# Patient Record
Sex: FEMALE | Race: BLACK OR AFRICAN AMERICAN | Employment: FULL TIME | URBAN - METROPOLITAN AREA
[De-identification: names, ages, dates, MRNs, and addresses within clinical notes are randomized per-mention and may not be internally consistent; named-entity substitution may affect disease eponyms.]

---

## 2021-03-23 ENCOUNTER — OFFICE VISIT (OUTPATIENT)
Dept: ENDOCRINOLOGY | Age: 61
End: 2021-03-23
Payer: COMMERCIAL

## 2021-03-23 VITALS
SYSTOLIC BLOOD PRESSURE: 155 MMHG | WEIGHT: 121 LBS | RESPIRATION RATE: 18 BRPM | TEMPERATURE: 98.6 F | HEART RATE: 65 BPM | OXYGEN SATURATION: 97 % | DIASTOLIC BLOOD PRESSURE: 82 MMHG

## 2021-03-23 DIAGNOSIS — E55.9 VITAMIN D DEFICIENCY: ICD-10-CM

## 2021-03-23 DIAGNOSIS — M81.0 SENILE OSTEOPOROSIS: ICD-10-CM

## 2021-03-23 DIAGNOSIS — E21.3 HYPERPARATHYROIDISM (HCC): Primary | ICD-10-CM

## 2021-03-23 DIAGNOSIS — E83.52 HYPERCALCEMIA: ICD-10-CM

## 2021-03-23 PROCEDURE — 99204 OFFICE O/P NEW MOD 45 MIN: CPT | Performed by: INTERNAL MEDICINE

## 2021-03-23 RX ORDER — PRAVASTATIN SODIUM 20 MG/1
20 TABLET ORAL 3 TIMES WEEKLY
COMMUNITY

## 2021-03-23 RX ORDER — ERGOCALCIFEROL 1.25 MG/1
50000 CAPSULE ORAL
COMMUNITY
End: 2021-06-28 | Stop reason: ALTCHOICE

## 2021-03-23 RX ORDER — ESTRADIOL 2 MG/1
TABLET ORAL EVERY OTHER DAY
COMMUNITY

## 2021-03-23 RX ORDER — ALBUTEROL SULFATE 90 UG/1
1 AEROSOL, METERED RESPIRATORY (INHALATION)
COMMUNITY

## 2021-03-23 RX ORDER — FLUTICASONE PROPIONATE 50 MCG
2 SPRAY, SUSPENSION (ML) NASAL DAILY
COMMUNITY

## 2021-03-23 RX ORDER — OMEPRAZOLE 40 MG/1
40 CAPSULE, DELAYED RELEASE ORAL DAILY
COMMUNITY
End: 2022-01-28 | Stop reason: ALTCHOICE

## 2021-03-23 RX ORDER — LANOLIN ALCOHOL/MO/W.PET/CERES
CREAM (GRAM) TOPICAL
COMMUNITY

## 2021-03-23 NOTE — LETTER
3/28/2021 Patient: Terri Singh YOB: 1960 Date of Visit: 3/23/2021 Odalis Brambila NP 
[address] [address] Via Fax: [phone] Dear Odalis Brambila NP, Thank you for referring Ms. Terri Singh to [address] [place] [address] for evaluation. My notes for this consultation are attached. If you have questions, please do not hesitate to call me. I look forward to following your patient along with you. Sincerely, Evaristo Fisher MD

## 2021-03-23 NOTE — PATIENT INSTRUCTIONS
SPECIFIC INSTRUCTIONS BELOW  
 
STOP drisdol  
 
 
---------------------------------------------------------------- 
 
 
24 hour urine collection instructions for calcium ,  And sodium and creatitine On the day you plan to collect urine 1. Discard first urine sample soon after you get up 2. Start collecting urine samples in the container then on whole first day . Taylor Sanchez ... 3. until the first sample next day is collected into the jar. PAY ATTENTION TO THESE GENERAL INSTRUCTIONS  
 
- HEALTH MAINTENANCE IS NOT GOING TO BE UP TO DATE ON YOUR AVS- PLEASE IGNORE  
- YOUR MED LIST IS NOT UP TO DATE AS SOME CHANGES ARE BEING MADE AFTER THE VISIT - FOLLOW SPECIFIC INSTRUCTIONS ABOVE Results *Normal results will not be notified by a phone call starting January 1 2021 *If you have an upcoming visit, the results will be discussed at the visit *Please sign up for MY CHART if you want access to your lab and test results *Abnormal results which require immediate attention will be notified by phone call *Abnormal results which do not require immediate assistance will be notified in 1-2 weeks Refills    -    have your pharmacy send us a refill request 
Phone calls  -  Allow  24 hrs. for non-urgent calls to be returned Prior authorization - It may take 2-4 weeks to process Forms  -  FMLA, DMV etc., will take up to 2 weeks to process Cancellations - please notify the office 2 days in advance Samples  - will only be dispensed at visits  
 
--------------------------------------------------------------------------------------------

## 2021-03-23 NOTE — PROGRESS NOTES
1. Have you been to the ER, urgent care clinic since your last visit?no  Hospitalized since your last visit? No    2. Have you seen or consulted any other health care providers outside of the [address] [place] [address] since your last visit? Include any pap smears or colon screening.  No    Wt Readings from Last 3 Encounters:   03/23/21 121 lb (54.9 kg)     Temp Readings from Last 3 Encounters:   03/23/21 98.6 °F (37 °C)     BP Readings from Last 3 Encounters:   03/23/21 (!) 155/82     Pulse Readings from Last 3 Encounters:   03/23/21 65

## 2021-03-23 NOTE — PROGRESS NOTES
HISTORY OF PRESENT ILLNESS  Jina Woody is a 64 y.o. female. HPI  Initial visit for hyper parathyroidism- intact PTH of 117   From nov 2020   Referred by pcp     Calcium was 10.7  Nov 2020     She is taking drisdol weekly   She has no hyper calcemia symptoms     Past Medical History:   Diagnosis Date    GERD (gastroesophageal reflux disease)     Hyperlipidemia        Social History     Socioeconomic History    Marital status: UNKNOWN     Spouse name: Not on file    Number of children: Not on file    Years of education: Not on file    Highest education level: Not on file   Occupational History    Not on file   Social Needs    Financial resource strain: Not on file    Food insecurity     Worry: Not on file     Inability: Not on file   [employer] needs     Medical: Not on file     Non-medical: Not on file   Tobacco Use    Smoking status: Current Every Day Smoker     Packs/day: 0.50     Years: 40.00     Pack years: 20.00    Smokeless tobacco: Never Used   Substance and Sexual Activity    Alcohol use:  Yes    Drug use: Not Currently     Types: Marijuana    Sexual activity: Not on file   Lifestyle    Physical activity     Days per week: Not on file     Minutes per session: Not on file    Stress: Not on file   Relationships    Social connections     Talks on phone: Not on file     Gets together: Not on file     Attends Voodoo service: Not on file     Active member of club or organization: Not on file     Attends meetings of clubs or organizations: Not on file     Relationship status: Not on file    Intimate partner violence     Fear of current or ex partner: Not on file     Emotionally abused: Not on file     Physically abused: Not on file     Forced sexual activity: Not on file   Other Topics Concern    Not on file   Social History Narrative    Not on file       Family History   Problem Relation Age of Onset    Diabetes Mother     Hypertension Mother     Lung Disease Father    Richelle Venegas Hypertension Sister     Diabetes Sister     Lung Disease Brother     Diabetes Brother     Diabetes Sister     Hypertension Sister        Review of Systems   Constitutional: Negative. HENT: Negative. Eyes: Negative for pain and redness. Respiratory: Negative. Cardiovascular: Negative for chest pain, palpitations and leg swelling. Gastrointestinal: Negative. Negative for constipation. Genitourinary: Negative. Musculoskeletal: Negative for myalgias. Skin: Negative. Neurological: Negative. Endo/Heme/Allergies: Negative. Psychiatric/Behavioral: Negative for depression and memory loss. The patient does not have insomnia. Physical Exam  Constitutional:       Appearance: She is well-developed. HENT:      Head: Normocephalic. Eyes:      Conjunctiva/sclera: Conjunctivae normal.      Pupils: Pupils are equal, round, and reactive to light. Neck:      Musculoskeletal: Normal range of motion and neck supple. Thyroid: No thyromegaly. Vascular: No JVD. Trachea: No tracheal deviation. Cardiovascular:      Rate and Rhythm: Normal rate and regular rhythm. Heart sounds: Normal heart sounds. No murmur. Pulmonary:      Breath sounds: Normal breath sounds. Abdominal:      General: Bowel sounds are normal.      Palpations: Abdomen is soft. Musculoskeletal: Normal range of motion. Lymphadenopathy:      Cervical: No cervical adenopathy. Skin:     General: Skin is warm. Neurological:      Mental Status: She is alert and oriented to person, place, and time. Deep Tendon Reflexes: Reflexes are normal and symmetric. Calcium was 10.7   And I PTH  Was   117   From November 2020     ASSESSMENT and PLAN    1.  Hypercalcemia  : Patient has mild range of hypercalcemia   Likely  primary hyperparathyroidism     Will order parathyroid scan, 24 hr urine for calcium, sodium , creatinine  and bone DEXA     As  most patients do not require aggressive intervention unless calcium levels are high  We opt for watchful observation  In some patients who get localized with parathyroid adenoma and if they satisfy surgical criterion we recommend surgical intervention    Patients can continue on taking low dose calcium supplements despite the hypecalcemia       2. Osteoporosis  : used to be  on fosamax 70 mg weekly  3 years ago and she stopped 2 months ago  - jan 2021 .   She  Takes  Estrace   Also and this protects her bone density     Reviewed results with patient and discussed the labs being ordered today/bnv  Patient voiced understanding of plan of care

## 2021-03-23 NOTE — LETTER
NOTIFICATION RETURN TO WORK / SCHOOL 
 
3/23/2021 11:25 AM 
 
Ms. [facility] 
[address] 
[facility] [address] To Whom It May Concern: 
 
[facility] is currently under the care of [address]. She will return to work/school on: 03/24/2021 If there are questions or concerns please have the patient contact our office. Sincerely, Yolanda White MD

## 2021-03-29 LAB
CALCIUM 24H UR-MRATE: 161 MG/24 HR (ref 142–353)
COLLECT DURATION TIME UR: 24 HR
CREAT 24H UR-MRATE: 946 MG/24HR (ref 600–1800)
SODIUM 24H UR-SRATE: NORMAL MMOL/24HR (ref 40–220)
SPECIMEN VOL 24H UR: 1250 ML
SPECIMEN VOL 24H UR: 1250 ML
SPECIMEN VOL ?TM UR: 1250 ML

## 2021-06-22 LAB
25(OH)D3+25(OH)D2 SERPL-MCNC: 28 NG/ML (ref 30–100)
ALBUMIN SERPL-MCNC: 4.5 G/DL (ref 3.8–4.8)
ALBUMIN/GLOB SERPL: 1.7 {RATIO} (ref 1.2–2.2)
ALP SERPL-CCNC: 83 IU/L (ref 48–121)
ALT SERPL-CCNC: 11 IU/L (ref 0–32)
AST SERPL-CCNC: 16 IU/L (ref 0–40)
BILIRUB SERPL-MCNC: 0.2 MG/DL (ref 0–1.2)
BUN SERPL-MCNC: 12 MG/DL (ref 8–27)
BUN/CREAT SERPL: 13 (ref 12–28)
CALCIUM SERPL-MCNC: 10.1 MG/DL (ref 8.7–10.3)
CHLORIDE SERPL-SCNC: 106 MMOL/L (ref 96–106)
CO2 SERPL-SCNC: 21 MMOL/L (ref 20–29)
CREAT SERPL-MCNC: 0.92 MG/DL (ref 0.57–1)
GLOBULIN SER CALC-MCNC: 2.7 G/DL (ref 1.5–4.5)
GLUCOSE SERPL-MCNC: 88 MG/DL (ref 65–99)
POTASSIUM SERPL-SCNC: 4.4 MMOL/L (ref 3.5–5.2)
PROT SERPL-MCNC: 7.2 G/DL (ref 6–8.5)
PTH-INTACT SERPL-MCNC: 103 PG/ML (ref 15–65)
SODIUM SERPL-SCNC: 141 MMOL/L (ref 134–144)

## 2021-06-28 ENCOUNTER — OFFICE VISIT (OUTPATIENT)
Dept: ENDOCRINOLOGY | Age: 61
End: 2021-06-28
Payer: COMMERCIAL

## 2021-06-28 VITALS
RESPIRATION RATE: 20 BRPM | BODY MASS INDEX: 20.22 KG/M2 | HEART RATE: 87 BPM | WEIGHT: 118.4 LBS | HEIGHT: 64 IN | DIASTOLIC BLOOD PRESSURE: 77 MMHG | SYSTOLIC BLOOD PRESSURE: 148 MMHG | OXYGEN SATURATION: 99 %

## 2021-06-28 DIAGNOSIS — M81.0 SENILE OSTEOPOROSIS: ICD-10-CM

## 2021-06-28 DIAGNOSIS — E83.52 HYPERCALCEMIA: ICD-10-CM

## 2021-06-28 DIAGNOSIS — E55.9 VITAMIN D DEFICIENCY: ICD-10-CM

## 2021-06-28 DIAGNOSIS — E21.3 HYPERPARATHYROIDISM (HCC): Primary | ICD-10-CM

## 2021-06-28 PROCEDURE — 99214 OFFICE O/P EST MOD 30 MIN: CPT | Performed by: INTERNAL MEDICINE

## 2021-06-28 NOTE — PROGRESS NOTES
Identified pt with two pt identifiers(name and ). Reviewed record in preparation for visit and have obtained necessary documentation. No chief complaint on file. Health Maintenance Due   Topic    Hepatitis C Screening     Pneumococcal 0-64 years (1 of 2 - PPSV23)    Lipid Screen     DTaP/Tdap/Td series (1 - Tdap)    PAP AKA CERVICAL CYTOLOGY     Shingrix Vaccine Age 49> (1 of 2)    Colorectal Cancer Screening Combo     Breast Cancer Screen Mammogram         Visit Vitals  BP (!) 148/77 (BP 1 Location: Left upper arm, BP Patient Position: Sitting, BP Cuff Size: Adult)   Pulse 87   Resp 20   Ht 5' 4\" (1.626 m)   Wt 118 lb 6.4 oz (53.7 kg)   LMP  (Approximate) Comment:    SpO2 99%   BMI 20.32 kg/m²     Pain Scale: 0 - No pain/10    Coordination of Care Questionnaire:  :   1. Have you been to the ER, urgent care clinic since your last visit? Hospitalized since your last visit? No    2. Have you seen or consulted any other health care providers outside of the [address] [place] [address] since your last visit? Include any pap smears or colon screening.  No

## 2021-06-28 NOTE — PROGRESS NOTES
HISTORY OF PRESENT ILLNESS  Rose Mary Uribe is a 64 y.o. female. First follow up after  Initial visit for hyper parathyroidism- intact PTH of 117   From March 2021       She had labs   She had no imaging done         March 2021       Referred by pcp   Calcium was 10.7  Nov 2020   She is taking drisdol weekly   She has no hyper calcemia symptoms         Review of Systems   None       Physical Exam   Constitutional: She is oriented to person, place, and time. She appears well-developed and well-nourished. Psychiatric: She has a normal mood and affect. LABS  :    calcium is 10.1  Intact PTH  Is 103    Calcium was 10.7   And I PTH  Was   117   From November 2020       ASSESSMENT and PLAN    1. Hypercalcemia  : Patient has mild range of hypercalcemia   Likely  primary hyperparathyroidism      parathyroid scan,  And usg    And dexa  Pending -   24 hr urine for calcium, sodium , creatinine  - normal calcium   As  most patients do not require aggressive intervention unless calcium levels are high  We opt for watchful observation  In some patients who get localized with parathyroid adenoma and if they satisfy surgical criterion we recommend surgical intervention    Patients can continue on taking low dose calcium supplements despite the hypecalcemia       2. Osteoporosis  : used to be  on fosamax 70 mg weekly  3 years ago and she stopped 2 months ago  - jan 2021 .   She  Takes  Estrace   Also and this protects her bone density       Reviewed results with patient and discussed the labs being ordered today/bnv  Patient voiced understanding of plan of care

## 2022-01-25 LAB
25(OH)D3 SERPL-MCNC: 40 NG/ML (ref 30–100)
ALB/GLOBRATIO, 58C: 1.5 (CALC) (ref 1–2.5)
ALBUMIN SERPL-MCNC: 4.4 G/DL (ref 3.6–5.1)
ALKALINE PHOSPHATASE, TOTAL, 25002000: 63 U/L (ref 37–153)
ALT SERPL-CCNC: 9 U/L (ref 6–29)
AST SERPL W P-5'-P-CCNC: 16 U/L (ref 10–35)
BASOPHILS # BLD: 40 CELLS/UL (ref 0–200)
BASOPHILS NFR BLD: 0.8 %
BILIRUB SERPL-MCNC: 0.3 MG/DL (ref 0.2–1.2)
BUN SERPL-MCNC: 13 MG/DL (ref 7–25)
BUN/CREATININE RATIO,BUCR: NORMAL (CALC) (ref 6–22)
CALCIUM SERPL-MCNC: 10.1 MG/DL (ref 8.6–10.4)
CHLORIDE SERPL-SCNC: 107 MMOL/L (ref 98–110)
CO2 SERPL-SCNC: 27 MMOL/L (ref 20–32)
CREAT SERPL-MCNC: 0.84 MG/DL (ref 0.5–0.99)
EOSINOPHIL # BLD: 90 CELLS/UL (ref 15–500)
EOSINOPHIL NFR BLD: 1.8 %
ERYTHROCYTE [DISTWIDTH] IN BLOOD BY AUTOMATED COUNT: 15.4 % (ref 11–15)
GLOBULIN,GLOB: 2.9 G/DL (CALC) (ref 1.9–3.7)
GLUCOSE SERPL-MCNC: 79 MG/DL (ref 65–139)
HCT VFR BLD AUTO: 38 % (ref 35–45)
HGB BLD-MCNC: 11.7 G/DL (ref 11.7–15.5)
LYMPHOCYTES # BLD: 1840 CELLS/UL (ref 850–3900)
LYMPHOCYTES NFR BLD: 36.8 %
MCH RBC QN AUTO: 22.5 PG (ref 27–33)
MCHC RBC AUTO-ENTMCNC: 30.8 G/DL (ref 32–36)
MCV RBC AUTO: 73.1 FL (ref 80–100)
MONOCYTES # BLD: 390 CELLS/UL (ref 200–950)
MONOCYTES NFR BLD: 7.8 %
NEUTROPHILS # BLD AUTO: 2640 CELLS/UL (ref 1500–7800)
NEUTROPHILS # BLD: 52.8 %
PLATELET # BLD AUTO: 272 THOUSAND/UL (ref 140–400)
PMV BLD AUTO: 10.2 FL (ref 7.5–12.5)
POTASSIUM SERPL-SCNC: 4.4 MMOL/L (ref 3.5–5.3)
PROT SERPL-MCNC: 7.3 G/DL (ref 6.1–8.1)
PTH INTACT,IPTH: 91 PG/ML (ref 14–64)
RBC # BLD AUTO: 5.2 MILLION/UL (ref 3.8–5.1)
SODIUM SERPL-SCNC: 139 MMOL/L (ref 135–146)
WBC # BLD AUTO: 5 THOUSAND/UL (ref 3.8–10.8)

## 2022-01-28 ENCOUNTER — OFFICE VISIT (OUTPATIENT)
Dept: ENDOCRINOLOGY | Age: 62
End: 2022-01-28
Payer: COMMERCIAL

## 2022-01-28 VITALS
HEART RATE: 82 BPM | RESPIRATION RATE: 18 BRPM | DIASTOLIC BLOOD PRESSURE: 87 MMHG | BODY MASS INDEX: 21.1 KG/M2 | TEMPERATURE: 98 F | OXYGEN SATURATION: 96 % | HEIGHT: 64 IN | SYSTOLIC BLOOD PRESSURE: 152 MMHG | WEIGHT: 123.6 LBS

## 2022-01-28 DIAGNOSIS — M81.0 SENILE OSTEOPOROSIS: ICD-10-CM

## 2022-01-28 DIAGNOSIS — E21.3 HYPERPARATHYROIDISM (HCC): Primary | ICD-10-CM

## 2022-01-28 PROCEDURE — 99214 OFFICE O/P EST MOD 30 MIN: CPT | Performed by: INTERNAL MEDICINE

## 2022-01-28 RX ORDER — CHOLECALCIFEROL (VITAMIN D3) 125 MCG
1 CAPSULE ORAL DAILY
COMMUNITY

## 2022-01-28 NOTE — LETTER
1/29/2022    Patient: Zoraida Taylor   YOB: 1960   Date of Visit: 1/28/2022     Carley Saunders NP  [address] [address]  Via Fax: [phone]    Dear Carley Saunders NP,      Thank you for referring Ms. Zoraida Taylor to [address] [place] [address] for evaluation. My notes for this consultation are attached. If you have questions, please do not hesitate to call me. I look forward to following your patient along with you.       Sincerely,    Tramaine Venegas MD

## 2022-01-28 NOTE — PROGRESS NOTES
HISTORY OF PRESENT ILLNESS  Manpreet Bronson is a 64 y.o. female. follow up after last  visit for hyper parathyroidism- intact PTH of 117   From June 2021     She did nto get labs done because of  High price   She is asymptomatic       June 2021     She had labs   She had no imaging done         March 2021       Referred by pcp   Calcium was 10.7  Nov 2020   She is taking drisdol weekly   She has no hyper calcemia symptoms         Review of Systems   None       Physical Exam   Constitutional: She is oriented to person, place, and time. She appears well-developed and well-nourished. Psychiatric: She has a normal mood and affect. LABS  :   calciumis 10.1   And I PTH is 91 from jan 2022         calcium is 10.1  Intact PTH  Is 103    Calcium was 10.7   And I PTH  Was   117   From November 2020   ASSESSMENT and PLAN    1. Hypercalcemia  : Patient has mild range of hypercalcemia   Likely  primary hyperparathyroidism      parathyroid scan,  And usg    And dexa  Pending -   As they were expensive - she could nto get them done   I am agreeing to wait upon these tests as there is no reason to spend lot of money given her calcium level,  She has no h/o kidney stones     As her calcium level is not above 11 and she has no other  Co-morbid states like renal stones  I opt for watchful observation  Will continue to keep her under observation  Patients can continue on taking low dose calcium supplements despite the hypecalcemia       2. Osteoporosis  : used to be  on fosamax 70 mg weekly  3 years ago and she stopped 2 months ago  - jan 2021 .   She  Takes  Estrace   Also and this protects her bone density       Reviewed results with patient and discussed the labs being ordered today/bnv  Patient voiced understanding of plan of care

## 2022-01-28 NOTE — PATIENT INSTRUCTIONS
SPECIFIC INSTRUCTIONS BELOW       No meds       -------------PAY ATTENTION TO THESE GENERAL INSTRUCTIONS -----------------      - The medications prescribed at this visit will not be available at pharmacy until 6 pm       - YOUR MED LIST IS NOT UP TO DATE AS SOME CHANGES ARE BEING MADE AFTER THE VISIT - FOLLOW SPECIFIC INSTRUCTIONS  ABOVE     -ANY tests other than blood work, which you opt to do  outside the  [facility] facilities, you are responsible for prior authorizations if  required    - [address] [facility]- PLEASE IGNORE     Results     *Normal results will not be notified by a phone call starting January 1 2021   *If you have an upcoming visit, the results will be discussed at the visit   *Please sign up for MY CHART if you want access to your lab and test results  *Abnormal results which require immediate attention will be notified by phone call   *Abnormal results which do not require immediate assistance will be notified in 1-2 weeks       Refills    -    have your pharmacy send us a refill request . Refills are done max for one year and a visit is a must before refills are extended    Follow up appointments -  highly encourage you to make it when you are checking out. We can accommodate you into the schedule based on your clinical situation, but not for extending refills beyond a year. Labs are important to give refills and is important to get labs before the visit     Phone calls  -  Allow  24 hrs.  for non-urgent calls to be returned  Prior authorization - It may take 2-4 weeks to process  Forms  -  FMLA, DMV etc., will take up to 2 weeks to process  Cancellations - please notify the office 2 days in advance   Samples  - will only be dispensed at visits       If not showing for the appointments and cancelling appointments within 24 hours are kept track of and three  of such situations in  two consecutive years will likely be considered for termination from the practice    -------------------------------------------------------------------------------------------------------------------

## 2022-11-12 LAB
CREATININE, EXTERNAL: 0.8
LDL CHOLESTEROL, EXTERNAL: 82

## 2022-12-19 ENCOUNTER — DOCUMENTATION ONLY (OUTPATIENT)
Dept: ENDOCRINOLOGY | Age: 62
End: 2022-12-19

## 2022-12-19 ENCOUNTER — VIRTUAL VISIT (OUTPATIENT)
Dept: ENDOCRINOLOGY | Age: 62
End: 2022-12-19
Payer: COMMERCIAL

## 2022-12-19 DIAGNOSIS — M81.0 SENILE OSTEOPOROSIS: ICD-10-CM

## 2022-12-19 DIAGNOSIS — E83.52 HYPERCALCEMIA: Primary | ICD-10-CM

## 2022-12-19 DIAGNOSIS — E21.3 HYPERPARATHYROIDISM (HCC): ICD-10-CM

## 2022-12-19 DIAGNOSIS — E55.9 VITAMIN D DEFICIENCY: ICD-10-CM

## 2022-12-19 PROCEDURE — 99214 OFFICE O/P EST MOD 30 MIN: CPT | Performed by: INTERNAL MEDICINE

## 2022-12-19 RX ORDER — HYDROCHLOROTHIAZIDE 12.5 MG/1
12.5 CAPSULE ORAL DAILY
COMMUNITY
Start: 2022-11-09

## 2022-12-19 NOTE — PROGRESS NOTES
HISTORY OF PRESENT ILLNESS  Mague Silva is a 58 y.o. female. Yearly follow up after last  visit for hyper parathyroidism- intact PTH of 117   From Jan 2022   ( Pre poned  from jan 2023 visit )       She underwent a few  tests for different reasons   She  has NOT had any parathyroid scans   In the inteirm, her BP meds are changed by pcp       Jan 2022   She did nto get labs done because of  High price   She is asymptomatic         March 2021   Referred by pcp   Calcium was 10.7  Nov 2020   She is taking drisdol weekly   She has no hyper calcemia symptoms         Review of Systems   None       Physical Exam   Constitutional: She is oriented to person, place, and time. She appears well-developed and well-nourished. Psychiatric: She has a normal mood and affect. LABS  :   Got labs from pcp nov 2022  -  calcium is 10.3   compared to 10.7   from  may 2022   Calcium is 10.1   And I PTH is 91 from jan 2022   calcium is 10.1  Intact PTH  Is 103    Calcium was 10.7   And I PTH  Was   117   From November 2020    ASSESSMENT and PLAN    1. Hypercalcemia  : Patient has mild range of hypercalcemia   Likely  primary hyperparathyroidism      parathyroid scan,  And usg    And dexa  Pending -   As they were expensive - she could nto get them done - JAN 2022     I AGREED  to wait upon these tests  JAN 2022,  as SHE MET THE DEDUCTIBLE BY having  DEXA, MAMMOGRAM ETC.,   Will do parathyroid scan and usg        She has no h/o kidney stones   As her calcium level is not above 11 and she has no other  Co-morbid states like renal stones  I opt for watchful observation  Will continue to keep her under observation  Patients can continue on taking low dose calcium supplements despite the hypecalcemia       2. Osteoporosis  : used to be  on fosamax 70 mg weekly  3 years ago and she stopped 2 months ago  - jan 2021 .   She  Takes  Estrace   Also and this protects her bone density   Date : July 12 2022 at  [email or web address]   Bone DEXA ap spine T-score -2.2 ; left femoral Neck T- score  -1.9, right femoral neck T-score n/a  Frax - major #   3.9%  and hip #  0.9 %   She got 2 years of drug holiday  - if jan 2023 comes in   We can resume weekly fosamax   OR  I/ reclast      Reviewed results with patient and discussed the labs being ordered today/bnv  Patient voiced understanding of plan of care

## 2022-12-19 NOTE — PROGRESS NOTES
Get the labs from [address] [place] [address] link   Also, addend the chief complaint as for what disease       Dominique Ugarte MD'

## 2022-12-19 NOTE — PATIENT INSTRUCTIONS
SPECIFIC INSTRUCTIONS BELOW     No meds         -------------PAY ATTENTION TO THESE GENERAL INSTRUCTIONS -----------------      - The medications prescribed at this visit will not be available at pharmacy until 6 pm       - YOUR MED LIST IS NOT UP TO DATE AS SOME CHANGES ARE BEING MADE AFTER THE VISIT - FOLLOW SPECIFIC INSTRUCTIONS  ABOVE     -ANY tests other than blood work, which you opt to do  outside the  [facility] facilities, you are responsible for prior authorizations if  required    - [address] [facility]- PLEASE IGNORE     Results     *Normal results will not be notified by a phone call starting January 1 2021   *If you have an upcoming visit, the results will be discussed at the visit   *Please sign up for MY CHART if you want access to your lab and test results  *Abnormal results which require immediate attention will be notified by phone call   *Abnormal results which do not require immediate assistance will be notified in 1-2 weeks       Refills    -    have your pharmacy send us a refill request . Refills are done max for one year and a visit is a must before refills are extended    Follow up appointments -  highly encourage you to make it when you are checking out. We can accommodate you into the schedule based on your clinical situation, but not for extending refills beyond a year. Labs are important to give refills and is important to get labs before the visit     Phone calls  -  Allow  24 hrs.  for non-urgent calls to be returned  Prior authorization - It may take 2-4 weeks to process  Forms  -  FMLA, DMV etc., will take up to 2 weeks to process  Cancellations - please notify the office 2 days in advance   Samples  - will only be dispensed at visits       If not showing for the appointments and cancelling appointments within 24 hours are kept track of and three  of such situations in  two consecutive years will likely be considered for termination from the practice    -------------------------------------------------------------------------------------------------------------------

## 2022-12-21 ENCOUNTER — DOCUMENTATION ONLY (OUTPATIENT)
Dept: ENDOCRINOLOGY | Age: 62
End: 2022-12-21

## 2022-12-21 NOTE — PROGRESS NOTES
REVIEWED HER DEXA - after video visit on dec 19     I plan to resume fosamax 70 mg once  weekly  in jan 2023    OR   She can opt for yearly  reclast weekly instead     Levi Venegas MD

## 2022-12-22 NOTE — PROGRESS NOTES
Called patient and advised her of Dr Omid Smiley comments. She states that she previously stopped Fosamax because it made her sick. She states that she will think about Reclast and return call with her decision in January.

## 2023-05-26 RX ORDER — ESTRADIOL 2 MG/1
TABLET ORAL EVERY OTHER DAY
COMMUNITY

## 2023-05-26 RX ORDER — ALBUTEROL SULFATE 90 UG/1
1 AEROSOL, METERED RESPIRATORY (INHALATION) EVERY 4 HOURS PRN
COMMUNITY

## 2023-05-26 RX ORDER — HYDROCHLOROTHIAZIDE 12.5 MG/1
12.5 CAPSULE, GELATIN COATED ORAL DAILY
COMMUNITY
Start: 2022-11-09

## 2023-05-26 RX ORDER — FERROUS SULFATE 325(65) MG
TABLET ORAL
COMMUNITY

## 2023-05-26 RX ORDER — FLUTICASONE PROPIONATE 50 MCG
2 SPRAY, SUSPENSION (ML) NASAL DAILY
COMMUNITY

## 2023-05-26 RX ORDER — PRAVASTATIN SODIUM 20 MG
20 TABLET ORAL
COMMUNITY

## 2023-07-24 ENCOUNTER — OFFICE VISIT (OUTPATIENT)
Age: 63
End: 2023-07-24
Payer: COMMERCIAL

## 2023-07-24 VITALS
TEMPERATURE: 97.1 F | SYSTOLIC BLOOD PRESSURE: 139 MMHG | BODY MASS INDEX: 19.36 KG/M2 | HEIGHT: 64 IN | DIASTOLIC BLOOD PRESSURE: 82 MMHG | HEART RATE: 73 BPM | RESPIRATION RATE: 20 BRPM | OXYGEN SATURATION: 98 % | WEIGHT: 113.4 LBS

## 2023-07-24 DIAGNOSIS — E83.52 HYPERCALCEMIA: Primary | ICD-10-CM

## 2023-07-24 DIAGNOSIS — M81.0 AGE-RELATED OSTEOPOROSIS WITHOUT CURRENT PATHOLOGICAL FRACTURE: ICD-10-CM

## 2023-07-24 DIAGNOSIS — E55.9 VITAMIN D DEFICIENCY, UNSPECIFIED: ICD-10-CM

## 2023-07-24 DIAGNOSIS — E21.3 HYPERPARATHYROIDISM, UNSPECIFIED (HCC): ICD-10-CM

## 2023-07-24 LAB
25(OH)D3 SERPL-MCNC: 35.7 NG/ML (ref 30–100)
ALBUMIN SERPL-MCNC: 3.8 G/DL (ref 3.5–5)
ALBUMIN/GLOB SERPL: 1.1 (ref 1.1–2.2)
ALP SERPL-CCNC: 72 U/L (ref 45–117)
ALT SERPL-CCNC: 16 U/L (ref 12–78)
ANION GAP SERPL CALC-SCNC: 2 MMOL/L (ref 5–15)
AST SERPL-CCNC: 13 U/L (ref 15–37)
BILIRUB SERPL-MCNC: 0.3 MG/DL (ref 0.2–1)
BUN SERPL-MCNC: 17 MG/DL (ref 6–20)
BUN/CREAT SERPL: 19 (ref 12–20)
CALCIUM SERPL-MCNC: 10 MG/DL (ref 8.5–10.1)
CALCIUM SERPL-MCNC: 9.9 MG/DL (ref 8.5–10.1)
CHLORIDE SERPL-SCNC: 109 MMOL/L (ref 97–108)
CO2 SERPL-SCNC: 27 MMOL/L (ref 21–32)
CREAT SERPL-MCNC: 0.91 MG/DL (ref 0.55–1.02)
GLOBULIN SER CALC-MCNC: 3.5 G/DL (ref 2–4)
GLUCOSE SERPL-MCNC: 80 MG/DL (ref 65–100)
POTASSIUM SERPL-SCNC: 4.5 MMOL/L (ref 3.5–5.1)
PROT SERPL-MCNC: 7.3 G/DL (ref 6.4–8.2)
PTH-INTACT SERPL-MCNC: 97.6 PG/ML (ref 18.4–88)
SODIUM SERPL-SCNC: 138 MMOL/L (ref 136–145)

## 2023-07-24 PROCEDURE — 99214 OFFICE O/P EST MOD 30 MIN: CPT | Performed by: INTERNAL MEDICINE

## 2023-07-24 NOTE — PATIENT INSTRUCTIONS
SPECIFIC INSTRUCTIONS BELOW     I/v reclast first infusion  at  [facility] to be scheduled         -------------PAY ATTENTION TO THESE GENERAL INSTRUCTIONS -----------------      - The medications prescribed at this visit will not be available at pharmacy until 6 pm       - YOUR MED LIST IS NOT UP TO DATE AS SOME CHANGES ARE BEING MADE AFTER THE VISIT - FOLLOW SPECIFIC INSTRUCTIONS  ABOVE     -ANY tests other than blood work, which you opt to do  outside the  [facility] imaging facilities, you are responsible for prior authorizations if  required    - [address] [place] [address] Drive [facility]- PLEASE IGNORE     Results     *Normal results will not be notified by a phone call starting January 1 2021   *If you have an upcoming visit, the results will be discussed at the visit   *Please sign up for MY CHART if you want access to your lab and test results  *Abnormal results which require immediate attention will be notified by phone call   *Abnormal results which do not require immediate assistance will be notified in 1-2 weeks       Refills    -    have your pharmacy send us a refill request . Refills are done max for one year and a visit is a must before refills are extended    Follow up appointments -  highly encourage you to make it when you are checking out. We can accommodate you into the schedule based on your clinical situation, but not for extending refills beyond a year. Labs are important to give refills and is important to get labs before the visit     Phone calls  -  Allow  24 hrs.  for non-urgent calls to be returned  Prior authorization - It may take 2-4 weeks to process  Forms  -  FMLA, DMV etc., will take up to 2 weeks to process  Cancellations - please notify the office 2 days in advance   Samples  - will only be dispensed at visits       If not showing for the appointments and cancelling appointments within 24 hours are kept track of and three  of such situations in  two consecutive

## 2023-07-24 NOTE — PROGRESS NOTES
Joi Rios MD FACE           HISTORY OF PRESENT ILLNESS  Reji Dickerson is a 61 y.o. female. Yearly follow up after last  visit for hyper parathyroidism-   From Dec  2022 , virtual visit   ( Pre poned  from jan 2023 visit )       She lost 10 lbs and is asking if this condition makes  her lose weight   She looks sick        Dec 2022       She underwent a few  tests for different reasons   She  has NOT had any parathyroid scans   In the inteirm, her BP meds are changed by pcp       Jan 2022   She did nto get labs done because of  High price   She is asymptomatic         March 2021   Referred by pcp   Calcium was 10.7  Nov 2020   She is taking drisdol weekly   She has no hyper calcemia symptoms         Review of Systems   None       Physical Exam   Constitutional: She is oriented to person, place, and time. She appears well-developed and well-nourished. Psychiatric: She has a normal mood and affect. LABS  :   Got labs from pcp nov 2022  -  calcium is 10.3   compared to 10.7   from  may 2022   Calcium is 10.1   And I PTH is 91 from jan 2022   calcium is 10.1  Intact PTH  Is 103    Calcium was 10.7   And I PTH  Was   117   From November 2020    ASSESSMENT and PLAN    1.  Hypercalcemia  : Patient has mild range of hypercalcemia   Likely  primary hyperparathyroidism      parathyroid scan,  And usg     pending still - again she forgot , ordered them again  today  July 2023     Date : July 2022  at [facility]    Bone DEXA  ap spine T-score -2.2 ; left femoral Neck T- score  -1.9 , right femoral neck T-score -1.9   FRAX  Major #   3.9 %     and      hip  #  0.9 %       She has no h/o kidney stones   As her calcium level is not above 11 and she has no other  Co-morbid states like renal stones  I opt for watchful observation  Will continue to keep her under observation  Patients can continue on taking low dose calcium supplements despite the

## 2023-08-01 DIAGNOSIS — E83.52 HYPERCALCEMIA: Primary | ICD-10-CM

## 2023-08-09 ENCOUNTER — HOSPITAL ENCOUNTER (OUTPATIENT)
Facility: HOSPITAL | Age: 63
Discharge: HOME | End: 2023-08-12
Attending: INTERNAL MEDICINE
Payer: COMMERCIAL

## 2023-08-09 DIAGNOSIS — E55.9 VITAMIN D DEFICIENCY, UNSPECIFIED: ICD-10-CM

## 2023-08-09 DIAGNOSIS — E21.3 HYPERPARATHYROIDISM, UNSPECIFIED (HCC): ICD-10-CM

## 2023-08-09 DIAGNOSIS — E83.52 HYPERCALCEMIA: ICD-10-CM

## 2023-08-09 DIAGNOSIS — M81.0 AGE-RELATED OSTEOPOROSIS WITHOUT CURRENT PATHOLOGICAL FRACTURE: ICD-10-CM

## 2023-08-09 PROCEDURE — 3430000000 HC RX DIAGNOSTIC RADIOPHARMACEUTICAL: Performed by: INTERNAL MEDICINE

## 2023-08-09 PROCEDURE — A9500 TC99M SESTAMIBI: HCPCS | Performed by: INTERNAL MEDICINE

## 2023-08-09 PROCEDURE — 78070 PARATHYROID PLANAR IMAGING: CPT

## 2023-08-09 PROCEDURE — 76536 US EXAM OF HEAD AND NECK: CPT

## 2023-08-09 RX ORDER — TETRAKIS(2-METHOXYISOBUTYLISOCYANIDE)COPPER(I) TETRAFLUOROBORATE 1 MG/ML
24.1 INJECTION, POWDER, LYOPHILIZED, FOR SOLUTION INTRAVENOUS
Status: COMPLETED | OUTPATIENT
Start: 2023-08-09 | End: 2023-08-09

## 2023-08-09 RX ADMIN — TETRAKIS(2-METHOXYISOBUTYLISOCYANIDE)COPPER(I) TETRAFLUOROBORATE 24.1 MILLICURIE: 1 INJECTION, POWDER, LYOPHILIZED, FOR SOLUTION INTRAVENOUS at 09:00

## 2024-07-19 ENCOUNTER — LAB (OUTPATIENT)
Age: 64
End: 2024-07-19

## 2024-07-19 DIAGNOSIS — E83.52 HYPERCALCEMIA: ICD-10-CM

## 2024-07-19 DIAGNOSIS — E55.9 VITAMIN D DEFICIENCY, UNSPECIFIED: ICD-10-CM

## 2024-07-19 DIAGNOSIS — M81.0 AGE-RELATED OSTEOPOROSIS WITHOUT CURRENT PATHOLOGICAL FRACTURE: ICD-10-CM

## 2024-07-22 LAB
25(OH)D3 SERPL-MCNC: 104.3 NG/ML (ref 30–100)
ALBUMIN SERPL-MCNC: 3.8 G/DL (ref 3.5–5)
ALBUMIN/GLOB SERPL: 1.2 (ref 1.1–2.2)
ALP SERPL-CCNC: 80 U/L (ref 45–117)
ALT SERPL-CCNC: 19 U/L (ref 12–78)
ANION GAP SERPL CALC-SCNC: 4 MMOL/L (ref 5–15)
AST SERPL-CCNC: 14 U/L (ref 15–37)
BILIRUB SERPL-MCNC: 0.4 MG/DL (ref 0.2–1)
BUN SERPL-MCNC: 16 MG/DL (ref 6–20)
BUN/CREAT SERPL: 20 (ref 12–20)
CALCIUM SERPL-MCNC: 10.1 MG/DL (ref 8.5–10.1)
CALCIUM SERPL-MCNC: 10.3 MG/DL (ref 8.5–10.1)
CHLORIDE SERPL-SCNC: 108 MMOL/L (ref 97–108)
CO2 SERPL-SCNC: 27 MMOL/L (ref 21–32)
CREAT SERPL-MCNC: 0.81 MG/DL (ref 0.55–1.02)
GLOBULIN SER CALC-MCNC: 3.2 G/DL (ref 2–4)
GLUCOSE SERPL-MCNC: 85 MG/DL (ref 65–100)
POTASSIUM SERPL-SCNC: 4.1 MMOL/L (ref 3.5–5.1)
PROT SERPL-MCNC: 7 G/DL (ref 6.4–8.2)
PTH-INTACT SERPL-MCNC: 36.4 PG/ML (ref 18.4–88)
SODIUM SERPL-SCNC: 139 MMOL/L (ref 136–145)

## 2024-07-24 ENCOUNTER — OFFICE VISIT (OUTPATIENT)
Age: 64
End: 2024-07-24
Payer: COMMERCIAL

## 2024-07-24 VITALS
WEIGHT: 112.6 LBS | OXYGEN SATURATION: 95 % | BODY MASS INDEX: 19.33 KG/M2 | SYSTOLIC BLOOD PRESSURE: 127 MMHG | HEART RATE: 79 BPM | DIASTOLIC BLOOD PRESSURE: 80 MMHG | TEMPERATURE: 97.7 F

## 2024-07-24 DIAGNOSIS — E55.9 VITAMIN D DEFICIENCY, UNSPECIFIED: ICD-10-CM

## 2024-07-24 DIAGNOSIS — E83.52 HYPERCALCEMIA: Primary | ICD-10-CM

## 2024-07-24 DIAGNOSIS — M81.0 AGE-RELATED OSTEOPOROSIS WITHOUT CURRENT PATHOLOGICAL FRACTURE: ICD-10-CM

## 2024-07-24 PROCEDURE — 99214 OFFICE O/P EST MOD 30 MIN: CPT | Performed by: INTERNAL MEDICINE

## 2024-07-24 RX ORDER — SERTRALINE HYDROCHLORIDE 25 MG/1
25 TABLET, FILM COATED ORAL AS NEEDED
COMMUNITY
Start: 2023-08-10

## 2024-07-24 NOTE — PROGRESS NOTES
[facility] DIABETES AND ENDOCRINOLOGY              Cristina Kuo MD FACE           HISTORY OF PRESENT ILLNESS  Alyson Oquendo is a 64  y.o. female.  Yearly follow up after last  visit for hyper parathyroidism-   From   July 2023        Lost 1 lb       July 2023     She lost 10 lbs and is asking if this condition makes  her lose weight   She looks sick        Dec 2022       She underwent a few  tests for different reasons   She  has NOT had any parathyroid scans   In the inteirm, her BP meds are changed by pcp       Jan 2022   She did nto get labs done because of  High price   She is asymptomatic         March 2021   Referred by pcp   Calcium was 10.7  Nov 2020   She is taking drisdol weekly   She has no hyper calcemia symptoms         Review of Systems   None       Physical Exam   Constitutional: She is oriented to person, place, and time. She appears well-developed and well-nourished.   Psychiatric: She has a normal mood and affect.       LABS  :     Lab Results   Component Value Date     07/19/2024    K 4.1 07/19/2024     07/19/2024    CO2 27 07/19/2024    BUN 16 07/19/2024    CREATININE 0.81 07/19/2024    GLUCOSE 85 07/19/2024    CALCIUM 10.3 (H) 07/19/2024    CALCIUM 10.1 07/19/2024    BILITOT 0.4 07/19/2024    ALKPHOS 80 07/19/2024    AST 14 (L) 07/19/2024    ALT 19 07/19/2024    LABGLOM 81 07/19/2024    GFRAA 87 01/24/2022    AGRATIO 1.7 06/21/2021    GLOB 3.2 07/19/2024      Intact PTH  is  in 30s   as   vit  D is over   100    Got labs from pcp nov 2022  -  calcium is 10.3   compared to 10.7   from  may 2022   Calcium is 10.1   And I PTH is 91 from jan 2022   calcium is 10.1  Intact PTH  Is 103    Calcium was 10.7   And I PTH  Was   117   From November 2020    ASSESSMENT and PLAN    1. Hypercalcemia  : Patient has mild range of hypercalcemia   Likely  primary hyperparathyroidism     July 2024  :   calcium is 10.3    ,  her vit D is over 100  ( she is now taking   5000 int units   - being

## 2024-07-24 NOTE — PATIENT INSTRUCTIONS
cancelling appointments within 24 hours are kept track of and three  of such situations in  two consecutive years will likely be considered for termination from the practice    -------------------------------------------------------------------------------------------------------------------

## 2024-09-04 ENCOUNTER — PATIENT MESSAGE (OUTPATIENT)
Age: 64
End: 2024-09-04

## 2024-09-05 NOTE — TELEPHONE ENCOUNTER
Patient decided to not proceed  with RECLAST because of cost burden  of   420 $     Cristina Kuo MD

## 2024-09-13 ENCOUNTER — HOSPITAL ENCOUNTER (OUTPATIENT)
Facility: HOSPITAL | Age: 64
Discharge: HOME | End: 2024-09-13
Payer: COMMERCIAL

## 2024-09-13 VITALS
TEMPERATURE: 98.1 F | SYSTOLIC BLOOD PRESSURE: 134 MMHG | DIASTOLIC BLOOD PRESSURE: 82 MMHG | WEIGHT: 113 LBS | HEIGHT: 65 IN | HEART RATE: 74 BPM | RESPIRATION RATE: 16 BRPM | BODY MASS INDEX: 18.83 KG/M2 | OXYGEN SATURATION: 98 %

## 2024-09-13 PROBLEM — M81.0 SENILE OSTEOPOROSIS: Status: ACTIVE | Noted: 2024-09-13

## 2024-09-13 LAB
ALBUMIN SERPL-MCNC: 3.7 G/DL (ref 3.5–5)
ANION GAP SERPL CALC-SCNC: 6 MMOL/L (ref 2–12)
BUN SERPL-MCNC: 15 MG/DL (ref 6–20)
BUN/CREAT SERPL: 16 (ref 12–20)
CA-I BLD-MCNC: 10.2 MG/DL (ref 8.5–10.1)
CHLORIDE SERPL-SCNC: 106 MMOL/L (ref 97–108)
CO2 SERPL-SCNC: 26 MMOL/L (ref 21–32)
CREAT SERPL-MCNC: 0.93 MG/DL (ref 0.55–1.02)
GLUCOSE SERPL-MCNC: 86 MG/DL (ref 65–100)
PHOSPHATE SERPL-MCNC: 2.6 MG/DL (ref 2.6–4.7)
POTASSIUM SERPL-SCNC: 4.3 MMOL/L (ref 3.5–5.1)
SODIUM SERPL-SCNC: 138 MMOL/L (ref 136–145)

## 2024-09-13 PROCEDURE — 36415 COLL VENOUS BLD VENIPUNCTURE: CPT

## 2024-09-13 PROCEDURE — 80069 RENAL FUNCTION PANEL: CPT

## 2024-09-13 PROCEDURE — 96365 THER/PROPH/DIAG IV INF INIT: CPT

## 2024-09-13 PROCEDURE — 6360000002 HC RX W HCPCS: Performed by: INTERNAL MEDICINE

## 2024-09-13 RX ORDER — ZOLEDRONIC ACID 5 MG/100ML
5 INJECTION, SOLUTION INTRAVENOUS ONCE
Status: COMPLETED | OUTPATIENT
Start: 2024-09-13 | End: 2024-09-13

## 2024-09-13 RX ADMIN — ZOLEDRONIC ACID 5 MG: 0.05 INJECTION, SOLUTION INTRAVENOUS at 15:20

## 2024-09-13 ASSESSMENT — PAIN SCALES - GENERAL: PAINLEVEL_OUTOF10: 0

## 2025-03-26 ENCOUNTER — TELEPHONE (OUTPATIENT)
Age: 65
End: 2025-03-26

## 2025-03-26 NOTE — TELEPHONE ENCOUNTER
Intact PTH    from pcp ( march 13 2025 )  is 87      Got one singled out number with no accompanying egfr  and    calcium       Cannot interpret it . Let pt know       Cristina Kuo MD

## 2025-03-26 NOTE — TELEPHONE ENCOUNTER
Informed pt of Dr. Kuo's note. Pt wants to know does she need to get any labs done and be seen sooner  than July. Pt stated she had labs done twice. Found labs in LC link, gave to Dr. Kuo and were reviewed. Per Dr. Kuo, informed pt doctor isn't concerned with results at this time, and pt fine to keep July appt and discuss then.

## 2025-07-24 ENCOUNTER — OFFICE VISIT (OUTPATIENT)
Age: 65
End: 2025-07-24

## 2025-07-24 VITALS
SYSTOLIC BLOOD PRESSURE: 130 MMHG | WEIGHT: 112.8 LBS | BODY MASS INDEX: 18.8 KG/M2 | TEMPERATURE: 97.6 F | DIASTOLIC BLOOD PRESSURE: 83 MMHG | HEIGHT: 65 IN | OXYGEN SATURATION: 95 % | HEART RATE: 75 BPM

## 2025-07-24 DIAGNOSIS — E55.9 VITAMIN D DEFICIENCY, UNSPECIFIED: ICD-10-CM

## 2025-07-24 DIAGNOSIS — M81.0 AGE-RELATED OSTEOPOROSIS WITHOUT CURRENT PATHOLOGICAL FRACTURE: ICD-10-CM

## 2025-07-24 DIAGNOSIS — E83.52 HYPERCALCEMIA: Primary | ICD-10-CM

## 2025-07-24 NOTE — PROGRESS NOTES
[facility] DIABETES AND ENDOCRINOLOGY              Cristina Kuo MD FACE           HISTORY OF PRESENT ILLNESS  Alyson Oquendo is a 65  y.o. female.  Yearly follow up after last  visit for hyper parathyroidism-  and  for  osteoporosis    From   July 2024       Pt has done well ,  no new medical issues ( glaucoma is new )  She is here for  reclast infusion      July 2024   Lost 1 lb     July 2023   She lost 10 lbs and is asking if this condition makes  her lose weight   She looks sick        Dec 2022   She underwent a few  tests for different reasons   She  has NOT had any parathyroid scans   In the inteirm, her BP meds are changed by pcp       Jan 2022   She did nto get labs done because of  High price   She is asymptomatic         March 2021   Referred by pcp   Calcium was 10.7  Nov 2020   She is taking drisdol weekly   She has no hyper calcemia symptoms         Review of Systems   None       Physical Exam   Constitutional: She is oriented to person, place, and time. She appears well-developed and well-nourished.   Psychiatric: She has a normal mood and affect.       LABS  :     Lab Results   Component Value Date     09/13/2024    K 4.3 09/13/2024     09/13/2024    CO2 26 09/13/2024    BUN 15 09/13/2024    CREATININE 0.93 09/13/2024    GLUCOSE 86 09/13/2024    CALCIUM 10.2 (H) 09/13/2024    BILITOT 0.4 07/19/2024    ALKPHOS 80 07/19/2024    AST 14 (L) 07/19/2024    ALT 19 07/19/2024    LABGLOM 69 09/13/2024    GFRAA 87 01/24/2022    AGRATIO 1.7 06/21/2021    GLOB 3.2 07/19/2024      Intact PTH  is  in 30s   as   vit  D is over   100    Got labs from pcp nov 2022  -  calcium is 10.3   compared to 10.7   from  may 2022   Calcium is 10.1   And I PTH is 91 from jan 2022   calcium is 10.1  Intact PTH  Is 103    Calcium was 10.7   And I PTH  Was   117   From November 2020    ASSESSMENT and PLAN    1. Hypercalcemia  : Patient has mild range of hypercalcemia   Likely  primary hyperparathyroidism 
Alyson Oquendo is a 65 y.o. female here for   Chief Complaint   Patient presents with    Hyperparathyroid       1. Have you been to the ER, urgent care clinic since your last visit?  Hospitalized since your last visit? -no    2. Have you seen or consulted any other health care providers outside of the [facility] since your last visit?  Include any pap smears or colon screening.-no      
Alert and oriented to person, place and time

## 2025-07-24 NOTE — PATIENT INSTRUCTIONS
SPECIFIC INSTRUCTIONS BELOW     I/v reclast  second    infusion  at  [facility] to be scheduled  aug 19  2025         Calcium 500 mg or 600 mg only as one pill a day       -------------PAY ATTENTION TO THESE GENERAL INSTRUCTIONS -----------------      - The medications prescribed at this visit will not be available at pharmacy until 6 pm       - YOUR MED LIST IS NOT UP TO DATE AS SOME CHANGES ARE BEING MADE AFTER THE VISIT - FOLLOW SPECIFIC INSTRUCTIONS  ABOVE     -ANY tests other than blood work, which you opt to do  outside the  [facility] imaging facilities, you are responsible for prior authorizations if  required    - HEALTH MAINTENANCE IS NOT GOING TO BE UP TO DATE ON YOUR AVS- PLEASE IGNORE     Results     *Normal results will not be notified by a phone call starting January 1 2021   *If you have an upcoming visit, the results will be discussed at the visit   *Please sign up for MY CHART if you want access to your lab and test results  *Abnormal results which require immediate attention will be notified by phone call   *Abnormal results which do not require immediate assistance will be notified in 1-2 weeks       Refills    -    have your pharmacy send us a refill request . Refills are done max for one year and a visit is a must before refills are extended    Follow up appointments -  highly encourage you to make it when you are checking out. We can accommodate you into the schedule based on your clinical situation, but not for extending refills beyond a year. Labs are important to give refills and is important to get labs before the visit     Phone calls  -  Allow  24 hrs. for non-urgent calls to be returned  Prior authorization - It may take 2-4 weeks to process  Forms  -  FMLA, DMV etc., will take up to 2 weeks to process  Cancellations - please notify the office 2 days in advance   Samples  - will only be dispensed at visits       If not showing for the appointments and cancelling appointments within 24 hours

## 2025-07-25 LAB
25(OH)D3+25(OH)D2 SERPL-MCNC: 28.2 NG/ML (ref 30–100)
ALBUMIN SERPL-MCNC: 4.5 G/DL (ref 3.9–4.9)
ALP SERPL-CCNC: 81 IU/L (ref 44–121)
ALT SERPL-CCNC: 9 IU/L (ref 0–32)
AST SERPL-CCNC: 15 IU/L (ref 0–40)
BILIRUB SERPL-MCNC: 0.3 MG/DL (ref 0–1.2)
BUN SERPL-MCNC: 13 MG/DL (ref 8–27)
BUN/CREAT SERPL: 15 (ref 12–28)
CALCIUM SERPL-MCNC: 10.5 MG/DL (ref 8.7–10.3)
CHLORIDE SERPL-SCNC: 104 MMOL/L (ref 96–106)
CO2 SERPL-SCNC: 23 MMOL/L (ref 20–29)
CREAT SERPL-MCNC: 0.86 MG/DL (ref 0.57–1)
EGFRCR SERPLBLD CKD-EPI 2021: 75 ML/MIN/1.73
GLOBULIN SER CALC-MCNC: 2.8 G/DL (ref 1.5–4.5)
GLUCOSE SERPL-MCNC: 82 MG/DL (ref 70–99)
POTASSIUM SERPL-SCNC: 4.3 MMOL/L (ref 3.5–5.2)
PROT SERPL-MCNC: 7.3 G/DL (ref 6–8.5)
SODIUM SERPL-SCNC: 142 MMOL/L (ref 134–144)

## 2025-07-26 LAB — PTH-INTACT SERPL-MCNC: 57 PG/ML (ref 15–65)

## 2025-07-27 ENCOUNTER — TELEPHONE (OUTPATIENT)
Age: 65
End: 2025-07-27